# Patient Record
Sex: FEMALE | Race: BLACK OR AFRICAN AMERICAN | Employment: UNEMPLOYED | URBAN - METROPOLITAN AREA
[De-identification: names, ages, dates, MRNs, and addresses within clinical notes are randomized per-mention and may not be internally consistent; named-entity substitution may affect disease eponyms.]

---
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
## 2019-01-16 ENCOUNTER — APPOINTMENT (OUTPATIENT)
Dept: GENERAL RADIOLOGY | Age: 19
End: 2019-01-16
Attending: EMERGENCY MEDICINE
Payer: COMMERCIAL

## 2019-01-16 ENCOUNTER — HOSPITAL ENCOUNTER (EMERGENCY)
Age: 19
Discharge: HOME | End: 2019-01-16
Attending: EMERGENCY MEDICINE
Payer: COMMERCIAL

## 2019-01-16 VITALS
RESPIRATION RATE: 16 BRPM | TEMPERATURE: 98 F | SYSTOLIC BLOOD PRESSURE: 151 MMHG | BODY MASS INDEX: 45.07 KG/M2 | WEIGHT: 280.43 LBS | HEIGHT: 66 IN | OXYGEN SATURATION: 99 % | HEART RATE: 70 BPM | DIASTOLIC BLOOD PRESSURE: 87 MMHG

## 2019-01-16 DIAGNOSIS — R07.89 ATYPICAL CHEST PAIN: Primary | ICD-10-CM

## 2019-01-16 LAB
ATRIAL RATE: 81 BPM
CALCULATED P AXIS, ECG09: 50 DEGREES
CALCULATED R AXIS, ECG10: 39 DEGREES
CALCULATED T AXIS, ECG11: 25 DEGREES
DIAGNOSIS, 93000: NORMAL
P-R INTERVAL, ECG05: 142 MS
Q-T INTERVAL, ECG07: 374 MS
QRS DURATION, ECG06: 76 MS
QTC CALCULATION (BEZET), ECG08: 434 MS
VENTRICULAR RATE, ECG03: 81 BPM

## 2019-01-16 PROCEDURE — 96372 THER/PROPH/DIAG INJ SC/IM: CPT

## 2019-01-16 PROCEDURE — 71046 X-RAY EXAM CHEST 2 VIEWS: CPT

## 2019-01-16 PROCEDURE — 93005 ELECTROCARDIOGRAM TRACING: CPT

## 2019-01-16 PROCEDURE — 74011250636 HC RX REV CODE- 250/636: Performed by: EMERGENCY MEDICINE

## 2019-01-16 PROCEDURE — 74011250637 HC RX REV CODE- 250/637: Performed by: EMERGENCY MEDICINE

## 2019-01-16 PROCEDURE — 99283 EMERGENCY DEPT VISIT LOW MDM: CPT

## 2019-01-16 RX ORDER — DICLOFENAC POTASSIUM 50 MG/1
50 TABLET, FILM COATED ORAL 3 TIMES DAILY
Qty: 20 TAB | Refills: 0 | Status: SHIPPED | OUTPATIENT
Start: 2019-01-16

## 2019-01-16 RX ORDER — CYCLOBENZAPRINE HCL 10 MG
10 TABLET ORAL
Qty: 20 TAB | Refills: 0 | Status: SHIPPED | OUTPATIENT
Start: 2019-01-16

## 2019-01-16 RX ORDER — KETOROLAC TROMETHAMINE 30 MG/ML
15 INJECTION, SOLUTION INTRAMUSCULAR; INTRAVENOUS
Status: COMPLETED | OUTPATIENT
Start: 2019-01-16 | End: 2019-01-16

## 2019-01-16 RX ORDER — CYCLOBENZAPRINE HCL 10 MG
10 TABLET ORAL
Status: COMPLETED | OUTPATIENT
Start: 2019-01-16 | End: 2019-01-16

## 2019-01-16 RX ADMIN — CYCLOBENZAPRINE HYDROCHLORIDE 10 MG: 10 TABLET, FILM COATED ORAL at 13:47

## 2019-01-16 RX ADMIN — KETOROLAC TROMETHAMINE 15 MG: 30 INJECTION, SOLUTION INTRAMUSCULAR at 12:32

## 2019-01-16 NOTE — DISCHARGE INSTRUCTIONS

## 2019-01-17 ENCOUNTER — PATIENT OUTREACH (OUTPATIENT)
Dept: OTHER | Age: 19
End: 2019-01-17

## 2019-01-17 NOTE — PROGRESS NOTES
HPRP progress note Patient eligible for [facility] Employee care management Received notification of discharge from [facility] ED on 1/17 for atypical chest pain and back pain. Contacted patient to discuss post discharge needs and offer care management services. Two patient identifiers verified. Discussed the care management program.  Patient agrees to care management services at this time. PMH:  
Past Medical History:  
Diagnosis Date  Asthma Social History:  
Social History Socioeconomic History  Marital status: SINGLE Spouse name: Not on file  Number of children: Not on file  Years of education: Not on file  Highest education level: Not on file Social Needs  Financial resource strain: Not on file  Food insecurity - worry: Not on file  Food insecurity - inability: Not on file  Transportation needs - medical: Not on file  Transportation needs - non-medical: Not on file Occupational History  Not on file Tobacco Use  Smoking status: Never Smoker  Smokeless tobacco: Never Used Substance and Sexual Activity  Alcohol use: No  
 Drug use: No  
 Sexual activity: No  
Other Topics Concern  Not on file Social History Narrative  Not on file Care management assessment completed: 
 
Condition Focused Assessment: will perform respiratory condition focused assessment on next call as this call was focused on musculoskeletal chest pain. Red Flags: 
Call 911 anytime you think you may need emergency care. For example, call if: 
  · You have chest pain or pressure. This may occur with: ? Sweating. ? Shortness of breath. ? Nausea or vomiting. ? Pain that spreads from the chest to the neck, jaw, or one or both shoulders or arms. ? Dizziness or lightheadedness. ? A fast or uneven pulse. After calling 911, chew 1 adult-strength aspirin. Wait for an ambulance. Do not try to drive yourself. · You have sudden chest pain and shortness of breath, or you cough up blood. Call your doctor now or seek immediate medical care if: 
  · You have any trouble breathing. · Your chest pain gets worse. · Your chest pain occurs consistently with exercise and is relieved by rest. 
 
Medications: 
New Medications at Discharge: cataflam 50mg three times a day for musculoskeletal pain and flexeril 10mg three times a day as needed for spasms Changed Medications at Discharge: none noted Discontinued Medications at Discharge: none noted Current Outpatient Medications Medication Sig  
 diclofenac potassium (CATAFLAM) 50 mg tablet Take 1 Tab by mouth three (3) times daily.  cyclobenzaprine (FLEXERIL) 10 mg tablet Take 1 Tab by mouth three (3) times daily as needed for Muscle Spasm(s).  inhalational spacing device (AEROCHAMBER MAX WITH FLOW-VU) Use as directed with Inhaler(s).  ALBUTEROL IN   
 predniSONE (STERAPRED) 5 mg dose pack See administration instruction per 5mg dose pack  mometasone (ASMANEX HFA) 200 mcg/actuation HFAA Take 2 Puffs by inhalation two (2) times a day.  mometasone (ASMANEX HFA) 200 mcg/actuation HFAA Take 2 Puffs by inhalation two (2) times a day.  mometasone-formoterol (DULERA) 200-5 mcg/actuation HFA inhaler Take 2 Puffs by inhalation two (2) times a day.  MICROGESTIN FE 1/20, 28, 1 mg-20 mcg (21)/75 mg (7) tab  albuterol (PROVENTIL HFA, VENTOLIN HFA) 90 mcg/actuation inhaler Take 2 puffs by inhalation every four (4) hours as needed for Wheezing. No current facility-administered medications for this visit. There are no discontinued medications. Performed medication reconciliation with patient, and patient verbalizes understanding of administration of home medications. There were no barriers to obtaining medications identified at this time. Preventive Care Health Maintenance Topic Date Due  
  HPV Age 9Y-34Y (1 - Female 3-dose series) 05/13/2010  
 DTaP/Tdap/Td series (6 - Tdap) 05/13/2010  Pneumococcal 19-64 Medium Risk (1 of 1 - PPSV23) 05/13/2018  Influenza Age 5 to Adult  08/01/2018  MEDICARE YEARLY EXAM  01/17/2019  Hepatitis A Peds Age 1-18  Aged Out  
 
 
 
CM Identified  Problems 1. Patient does not have current PCP, and has history of asthma. 2. Ineffective airway clearance related to asthma Goals Goals  Knowledge and adherence of medication (ie. action, side effects, missed dose, etc.) Review roles of different meds in the treatment of asthma, including the difference between long-term controller medications and short-acting medications for quick-relief Patient will use preventive medication as recommended.  Patient verbalizes understanding of self -management goals of living with Asthma. Patient will schedule initial visit with new adult primary care provider, and have regular follow-up as often as recommended.  Understands red flags post discharge. Call 911 anytime you think you may need emergency care. For example, call if: 
  · You have chest pain or pressure. This may occur with: ? Sweating. ? Shortness of breath. ? Nausea or vomiting. ? Pain that spreads from the chest to the neck, jaw, or one or both shoulders or arms. ? Dizziness or lightheadedness. ? A fast or uneven pulse. After calling 911, chew 1 adult-strength aspirin. Wait for an ambulance. Do not try to drive yourself. · You have sudden chest pain and shortness of breath, or you cough up blood. Call your doctor now or seek immediate medical care if: 
  · You have any trouble breathing. · Your chest pain gets worse. · Your chest pain occurs consistently with exercise and is relieved by rest. 
  
  
 
 
Barriers/Support system: 
patient and mother Barriers/Challenges to Care: []  Decline in memory    []  Language barrier []  Emotional                  []  Limited mobility 
[]  Lack of motivation     [] Vision, hearing or cognitive impairment [x]  Knowledge [] Financial Barriers []  Lack of support  []  Pain []  Other []  None PCP/Specialist follow up: Patient states she does not have current PCP, but may be seeing mother's PCP, this CM offered to assist if unable to get appointment or has any barriers to getting appointment. No future appointments. Reviewed red flags with patient, and patient verbalizes understanding. Patient given an opportunity to ask questions. No other clinical/social/functional needs noted. The patient agrees to contact the PCP office for questions related to their healthcare. The patient expressed thanks, offered no additional questions and ended the call. Plan for next call: discuss PCP appointment, assist if no appointment scheduled

## 2019-02-01 ENCOUNTER — PATIENT OUTREACH (OUTPATIENT)
Dept: OTHER | Age: 19
End: 2019-02-01

## 2019-02-01 NOTE — PROGRESS NOTES
Follow up phone call to patient, two pt identifiers verified. Discussed patient's goals:  
Goals  Knowledge and adherence of medication (ie. action, side effects, missed dose, etc.) Review roles of different meds in the treatment of asthma, including the difference between long-term controller medications and short-acting medications for quick-relief Patient will use preventive medication as recommended.  Patient verbalizes understanding of self -management goals of living with Asthma. Patient will schedule initial visit with new adult primary care provider, and have regular follow-up as often as recommended.  Understands red flags post discharge. Call 911 anytime you think you may need emergency care. For example, call if: 
  · You have chest pain or pressure. This may occur with: ? Sweating. ? Shortness of breath. ? Nausea or vomiting. ? Pain that spreads from the chest to the neck, jaw, or one or both shoulders or arms. ? Dizziness or lightheadedness. ? A fast or uneven pulse. After calling 911, chew 1 adult-strength aspirin. Wait for an ambulance. Do not try to drive yourself. · You have sudden chest pain and shortness of breath, or you cough up blood. Call your doctor now or seek immediate medical care if: 
  · You have any trouble breathing. · Your chest pain gets worse. · Your chest pain occurs consistently with exercise and is relieved by rest. 
  
  
 
 
Condition Focused Assessment Orthopedic Condition Focused Assessment Skin- any open wounds or incisions? no 
Description and location of wound- n/a Have you recently had any of the following? Any recent changes in activity yes, moving more at job, lifting heavy items, bending while lifting Does patient have incentive spirometer? no If yes, how frequently is patient using incentive spirometer? n/a Mobility or assistive device in place no DME needs addressed no PT/OT/ST ordered - discussed with patient to start relationship with PCP Pain rated as 2/10 to left middle described as dull, achy pain 
Numbness or tingling yes, to left arm falls asleep while laying on side Weakness yes, also tired throughout day Trouble with coordinating your movement, or change in gait no New or worsening pain to calf, back of knee or groin no Redness, swelling to leg or groin no Fever no Nausea yes, but started birth control pills again Vomiting no Chills or clammy skin no Change in urine output no Recent change in urinary or bowel continence no Change in speech no Difficulty swallowing no Dizziness/lightheadedness no Sleep disturbance yes Visual changes no Medication changes? no 
 
 
Readiness to Change: []  Pre-contemplative    []  Contemplative [x]  Preparation               []  Action                  []  Maintenance Barriers/Challenges to Care: []  Decline in memory    []  Language barrier    
[]  Emotional                  [x]  Limited mobility 
[]  Lack of motivation     [] Vision, hearing or cognitive impairment []  Knowledge [] Financial Barriers []  Lack of support  []  Pain []  Other []  None Key pt activities to achieve better health:  
[]  Weight loss 
[]  Improved diabetic control 
[]  Decreased cholesterol levels 
[]  Decreased blood pressure [x]  Primary Care provider appointment 
[] Upcoming appointments: No future appointments. Plan for next call: one week, discuss PCP appt, may need PT for back pain

## 2019-02-08 ENCOUNTER — PATIENT OUTREACH (OUTPATIENT)
Dept: OTHER | Age: 19
End: 2019-02-08

## 2019-02-08 NOTE — PROGRESS NOTES
Follow up phone call to patient, two pt identifiers verified. Discussed patient's goals:  
Goals  Knowledge and adherence of medication (ie. action, side effects, missed dose, etc.) Review roles of different meds in the treatment of asthma, including the difference between long-term controller medications and short-acting medications for quick-relief Patient will use preventive medication as recommended.  Patient verbalizes understanding of self -management goals of living with Asthma. Patient will schedule initial visit with new adult primary care provider, and have regular follow-up as often as recommended.  Understands red flags post discharge. Call 911 anytime you think you may need emergency care. For example, call if: 
  · You have chest pain or pressure. This may occur with: ? Sweating. ? Shortness of breath. ? Nausea or vomiting. ? Pain that spreads from the chest to the neck, jaw, or one or both shoulders or arms. ? Dizziness or lightheadedness. ? A fast or uneven pulse. After calling 911, chew 1 adult-strength aspirin. Wait for an ambulance. Do not try to drive yourself. · You have sudden chest pain and shortness of breath, or you cough up blood. Call your doctor now or seek immediate medical care if: 
  · You have any trouble breathing. · Your chest pain gets worse. · Your chest pain occurs consistently with exercise and is relieved by rest. 
  
  
 
Patient has not yet scheduled initial visit with PCP, discussed the co-pay for [facility] provider, and importance of regular exams and lab work. Patient still having gregorio musculoskeletal pain to chest and back, somewhat improved. Denied heart burn or red flag symptoms. Readiness to Change: []  Pre-contemplative    [x]  Contemplative 
[]  Preparation               []  Action                  []  Maintenance Barriers/Challenges to Care: []  Decline in memory    []  Language barrier []  Emotional                  []  Limited mobility 
[]  Lack of motivation     [] Vision, hearing or cognitive impairment [x]  Knowledge [x] Financial Barriers []  Lack of support  []  Pain []  Other []  None Key pt activities to achieve better health:  
[x]  Weight loss 
[]  Improved diabetic control 
[]  Decreased cholesterol levels 
[]  Decreased blood pressure 
[]   
[] Upcoming appointments: No future appointments. Again encouraged patient to schedule an appointment, and to contact this CM for any needed assistance. Patient verbalized understanding. Plan for next call: 2 weeks, check on progress with provider being established

## 2019-03-08 ENCOUNTER — PATIENT OUTREACH (OUTPATIENT)
Dept: OTHER | Age: 19
End: 2019-03-08

## 2019-03-08 NOTE — PROGRESS NOTES
Attempt to reach patient for follow up. Patient unable to speak at this time and requests call back at later time.

## 2019-03-20 ENCOUNTER — PATIENT OUTREACH (OUTPATIENT)
Dept: OTHER | Age: 19
End: 2019-03-20

## 2019-03-20 NOTE — LETTER
3/20/2019 4:34 PM 
 
Ms. Thierry Zepeda [address] [facility] [id number] Dear Ms. Zepeda,  
 
I have been trying to reach you for a follow up call for services with our Employee Care Management Program. Your wellbeing is very important to us. With continued partnership in the [facility] program, we hope to work with you to optimize your health and increase your quality of life. I look forward to continuing to work with you. Please contact me at your convenience and we can schedule a time that works best for you. Sincerely, Xi Escobar RN, BSN  Employee Care Manager Lead [address] [place] [address], [address] [place], [place] [address] [facility] Cell [phone] Fax [email or web address] aspx

## 2019-04-05 ENCOUNTER — PATIENT OUTREACH (OUTPATIENT)
Dept: OTHER | Age: 19
End: 2019-04-05

## 2019-04-05 NOTE — LETTER
4/5/2019 10:56 AM 
 
Ms. Thierry Zepeda [address] [facility] [address] Dear Ms. Zepeda, My name is Zaire Gilmore, Employee Care Manager for [facility], and I have been trying to reach you. The Employee Care Management [employer]) program is a free-of-charge, confidential service provided to our employees and their family members covered by the [facility]. I can help you with care transitions such as when you come home from the hospital, when help is needed to manage your disease, or when you need assistance coordinating services or appointments. [employer] now partners with [facility]. If you are a qualifying employee, you may receive an additional 10 wellness incentive points for every month of active participation with an Employee Care Manager. As healthcare providers, we know that patients do better when they have close follow up with a primary care provider (PCP). I can help you find one that is convenient to you and covered by your insurance. I can also help you understand any after visit instructions, such as what symptoms to watch out for, or any new or changed medications. We can work together using your preferred communication -- telephone, email, OluKaihart. If you do not have a [employer] account, I can help you request access. Our program is designed to provide you with the opportunity to have a [facility] care manager partner with you for your healthcare needs. Due to not being able to reach you, I am closing out the current program, but will remain available to you should you have any questions. Please contact me at the below number if I can provide you with assistance for any of the above services. Zaire Gilmore, RN, BSN  Employee Care Manager [address] [place] [address], [address] [place], [place] [address] [facility] Cell [phone] Fax [email or web address] aspx

## 2020-02-26 ENCOUNTER — OFFICE VISIT (OUTPATIENT)
Dept: PRIMARY CARE CLINIC | Age: 20
End: 2020-02-26

## 2020-02-26 VITALS
SYSTOLIC BLOOD PRESSURE: 134 MMHG | TEMPERATURE: 98.6 F | HEART RATE: 98 BPM | DIASTOLIC BLOOD PRESSURE: 84 MMHG | BODY MASS INDEX: 45 KG/M2 | WEIGHT: 280 LBS | HEIGHT: 66 IN | RESPIRATION RATE: 16 BRPM | OXYGEN SATURATION: 98 %

## 2020-02-26 DIAGNOSIS — S61.411A LACERATION OF RIGHT HAND WITHOUT FOREIGN BODY, INITIAL ENCOUNTER: Primary | ICD-10-CM
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
## 2020-02-27 NOTE — PATIENT INSTRUCTIONS
Cuts: Care Instructions  Your Care Instructions  A cut can happen anywhere on your body. Stitches, staples, skin adhesives, or pieces of tape called Steri-Strips are sometimes used to keep the edges of a cut together and help it heal. Steri-Strips can be used by themselves or with stitches or staples. Sometimes cuts are left open. If the cut went deep and through the skin, the doctor may have closed the cut in two layers. A deeper layer of stitches brings the deep part of the cut together. These stitches will dissolve and don't need to be removed. The upper layer closure, which could be stitches, staples, Steri-Strips, or adhesive, is what you see on the cut. A cut is often covered by a bandage. The doctor has checked you carefully, but problems can develop later. If you notice any problems or new symptoms, get medical treatment right away. Follow-up care is a key part of your treatment and safety. Be sure to make and go to all appointments, and call your doctor if you are having problems. It's also a good idea to know your test results and keep a list of the medicines you take. How can you care for yourself at home? If a cut is open or closed  · Prop up the sore area on a pillow anytime you sit or lie down during the next 3 days. Try to keep it above the level of your heart. This will help reduce swelling. · Keep the cut dry for the first 24 to 48 hours. After this, you can shower if your doctor okays it. Pat the cut dry. · Don't soak the cut, such as in a bathtub. Your doctor will tell you when it's safe to get the cut wet. · After the first 24 to 48 hours, clean the cut with soap and water 2 times a day unless your doctor gives you different instructions. ? Don't use hydrogen peroxide or alcohol, which can slow healing. ? You may cover the cut with a thin layer of petroleum jelly and a nonstick bandage.   ? If the doctor put a bandage over the cut, put on a new bandage after cleaning the cut or if the bandage gets wet or dirty. · Avoid any activity that could cause your cut to reopen. · Be safe with medicines. Read and follow all instructions on the label. ? If the doctor gave you a prescription medicine for pain, take it as prescribed. ? If you are not taking a prescription pain medicine, ask your doctor if you can take an over-the-counter medicine. If the cut is closed with stitches, staples, or Steri-Strips  · Follow the above instructions for open or closed cuts. · Do not remove the stitches or staples on your own. Your doctor will tell you when to come back to have the stitches or staples removed. · Leave Steri-Strips on until they fall off. If the cut is closed with a skin adhesive  · Follow the above instructions for open or closed cuts. · Leave the skin adhesive on your skin until it falls off on its own. This may take 5 to 10 days. · Do not scratch, rub, or pick at the adhesive. · Do not put the sticky part of a bandage directly on the adhesive. · Do not put any kind of ointment, cream, or lotion over the area. This can make the adhesive fall off too soon. Do not use hydrogen peroxide or alcohol, which can slow healing. When should you call for help? Call your doctor now or seek immediate medical care if:    · You have new pain, or your pain gets worse.     · The skin near the cut is cold or pale or changes color.     · You have tingling, weakness, or numbness near the cut.     · The cut starts to bleed, and blood soaks through the bandage. Oozing small amounts of blood is normal.     · You have trouble moving the area near the cut.     · You have symptoms of infection, such as:  ? Increased pain, swelling, warmth, or redness around the cut.  ? Red streaks leading from the cut.  ? Pus draining from the cut.  ? A fever.    Watch closely for changes in your health, and be sure to contact your doctor if:    · The cut reopens.     · You do not get better as expected.    Where can you learn more? Go to http://fab[employer]-mark anthony.info/. Enter M735 in the search box to learn more about \"Cuts: Care Instructions. \"  Current as of: June 26, 2019  Content Version: 12.2  © 3188-7984 [employer]. Care instructions adapted under license by [employer] (which disclaims liability or warranty for this information). If you have questions about a medical condition or this instruction, always ask your healthcare professional. [address] any warranty or liability for your use of this information.

## 2020-02-27 NOTE — PROGRESS NOTES
HISTORY OF PRESENT ILLNESS  Thierry Zepeda is a 20 y.o. female. Hand Injury    The history is provided by the patient. The current episode started 1 to 2 hours ago. The problem occurs constantly. The problem has not changed since onset. The pain is present in the right hand. The quality of the pain is described as aching. The pain is mild. Pertinent negatives include no numbness, full range of motion, no tingling, no back pain and no neck pain. She has tried nothing for the symptoms. There has been a history of trauma. Review of Systems   Constitutional: Negative for chills, fever and weight loss. Respiratory: Negative for cough and shortness of breath. Cardiovascular: Negative for chest pain and palpitations. Musculoskeletal: Negative for back pain, falls, joint pain, myalgias and neck pain. Skin: Negative for rash. Neurological: Negative for dizziness, tingling, tremors, sensory change, focal weakness, weakness, numbness and headaches. Psychiatric/Behavioral: Negative for depression, hallucinations, substance abuse and suicidal ideas. The patient is not nervous/anxious and does not have insomnia. Physical Exam  Skin:            Comments: 3mm laceration          ASSESSMENT and PLAN    ICD-10-CM ICD-9-CM    1. Laceration of right hand without foreign body, initial encounter S61.411A 882.0    I have discussed the diagnosis with the patient and the intended plan as seen in the above orders. The patient has received an after-visit summary and questions were answered concerning future plans. I have discussed medication side effects and warnings with the patient as well. Follow-up Disposition:  Advised ER if symptoms worsen or fail to improve.    Patient declined Estela Strickland